# Patient Record
Sex: MALE | Race: WHITE | NOT HISPANIC OR LATINO | Employment: FULL TIME | URBAN - METROPOLITAN AREA
[De-identification: names, ages, dates, MRNs, and addresses within clinical notes are randomized per-mention and may not be internally consistent; named-entity substitution may affect disease eponyms.]

---

## 2017-09-19 ENCOUNTER — APPOINTMENT (OUTPATIENT)
Dept: RADIOLOGY | Facility: MEDICAL CENTER | Age: 27
DRG: 057 | End: 2017-09-19
Attending: EMERGENCY MEDICINE

## 2017-09-19 ENCOUNTER — HOSPITAL ENCOUNTER (INPATIENT)
Facility: MEDICAL CENTER | Age: 27
LOS: 1 days | DRG: 057 | End: 2017-09-20
Attending: EMERGENCY MEDICINE | Admitting: HOSPITALIST

## 2017-09-19 ENCOUNTER — RESOLUTE PROFESSIONAL BILLING HOSPITAL PROF FEE (OUTPATIENT)
Dept: HOSPITALIST | Facility: MEDICAL CENTER | Age: 27
End: 2017-09-19

## 2017-09-19 DIAGNOSIS — R40.2420: ICD-10-CM

## 2017-09-19 DIAGNOSIS — F10.929 ALCOHOL INTOXICATION, WITH UNSPECIFIED COMPLICATION (HCC): ICD-10-CM

## 2017-09-19 DIAGNOSIS — M62.82 NON-TRAUMATIC RHABDOMYOLYSIS: ICD-10-CM

## 2017-09-19 DIAGNOSIS — T68.XXXA HYPOTHERMIA, INITIAL ENCOUNTER: ICD-10-CM

## 2017-09-19 PROBLEM — G93.40 ENCEPHALOPATHY: Status: ACTIVE | Noted: 2017-09-19

## 2017-09-19 LAB
ALBUMIN SERPL BCP-MCNC: 3.5 G/DL (ref 3.2–4.9)
ALBUMIN SERPL BCP-MCNC: 4.5 G/DL (ref 3.2–4.9)
ALBUMIN/GLOB SERPL: 1.3 G/DL
ALBUMIN/GLOB SERPL: 1.4 G/DL
ALP SERPL-CCNC: 36 U/L (ref 30–99)
ALP SERPL-CCNC: 46 U/L (ref 30–99)
ALT SERPL-CCNC: 118 U/L (ref 2–50)
ALT SERPL-CCNC: 90 U/L (ref 2–50)
AMPHETAMINES UR QL: POSITIVE
ANION GAP SERPL CALC-SCNC: 9 MMOL/L (ref 0–11.9)
ANION GAP SERPL CALC-SCNC: 9 MMOL/L (ref 0–11.9)
AST SERPL-CCNC: 129 U/L (ref 12–45)
AST SERPL-CCNC: 93 U/L (ref 12–45)
BARBITURATES UR QL SCN: NEGATIVE
BASOPHILS # BLD AUTO: 0.8 % (ref 0–1.8)
BASOPHILS # BLD AUTO: 0.9 % (ref 0–1.8)
BASOPHILS # BLD: 0.07 K/UL (ref 0–0.12)
BASOPHILS # BLD: 0.07 K/UL (ref 0–0.12)
BENZODIAZ UR QL SCN: POSITIVE
BILIRUB SERPL-MCNC: 0.5 MG/DL (ref 0.1–1.5)
BILIRUB SERPL-MCNC: 0.7 MG/DL (ref 0.1–1.5)
BUN SERPL-MCNC: 11 MG/DL (ref 8–22)
BUN SERPL-MCNC: 12 MG/DL (ref 8–22)
BZE UR QL SCN: POSITIVE
CALCIUM SERPL-MCNC: 7.2 MG/DL (ref 8.4–10.2)
CALCIUM SERPL-MCNC: 8.2 MG/DL (ref 8.4–10.2)
CHLORIDE SERPL-SCNC: 105 MMOL/L (ref 96–112)
CHLORIDE SERPL-SCNC: 109 MMOL/L (ref 96–112)
CK SERPL-CCNC: 2588 U/L (ref 0–154)
CO2 SERPL-SCNC: 23 MMOL/L (ref 20–33)
CO2 SERPL-SCNC: 27 MMOL/L (ref 20–33)
CREAT SERPL-MCNC: 0.9 MG/DL (ref 0.5–1.4)
CREAT SERPL-MCNC: 1.2 MG/DL (ref 0.5–1.4)
EKG IMPRESSION: NORMAL
EOSINOPHIL # BLD AUTO: 0.09 K/UL (ref 0–0.51)
EOSINOPHIL # BLD AUTO: 0.13 K/UL (ref 0–0.51)
EOSINOPHIL NFR BLD: 1 % (ref 0–6.9)
EOSINOPHIL NFR BLD: 1.7 % (ref 0–6.9)
ERYTHROCYTE [DISTWIDTH] IN BLOOD BY AUTOMATED COUNT: 39.9 FL (ref 35.9–50)
ERYTHROCYTE [DISTWIDTH] IN BLOOD BY AUTOMATED COUNT: 40 FL (ref 35.9–50)
ETHANOL BLD-MCNC: 0.37 G/DL
GFR SERPL CREATININE-BSD FRML MDRD: >60 ML/MIN/1.73 M 2
GFR SERPL CREATININE-BSD FRML MDRD: >60 ML/MIN/1.73 M 2
GLOBULIN SER CALC-MCNC: 2.7 G/DL (ref 1.9–3.5)
GLOBULIN SER CALC-MCNC: 3.2 G/DL (ref 1.9–3.5)
GLUCOSE SERPL-MCNC: 104 MG/DL (ref 65–99)
GLUCOSE SERPL-MCNC: 93 MG/DL (ref 65–99)
HCT VFR BLD AUTO: 38.8 % (ref 42–52)
HCT VFR BLD AUTO: 46.9 % (ref 42–52)
HGB BLD-MCNC: 13.2 G/DL (ref 14–18)
HGB BLD-MCNC: 15.9 G/DL (ref 14–18)
IMM GRANULOCYTES # BLD AUTO: 0.02 K/UL (ref 0–0.11)
IMM GRANULOCYTES # BLD AUTO: 0.03 K/UL (ref 0–0.11)
IMM GRANULOCYTES NFR BLD AUTO: 0.3 % (ref 0–0.9)
IMM GRANULOCYTES NFR BLD AUTO: 0.3 % (ref 0–0.9)
LACTATE BLD-SCNC: 2.04 MMOL/L (ref 0.5–2)
LIPASE SERPL-CCNC: 32 U/L (ref 7–58)
LYMPHOCYTES # BLD AUTO: 1.97 K/UL (ref 1–4.8)
LYMPHOCYTES # BLD AUTO: 2.21 K/UL (ref 1–4.8)
LYMPHOCYTES NFR BLD: 24.3 % (ref 22–41)
LYMPHOCYTES NFR BLD: 26.4 % (ref 22–41)
MAGNESIUM SERPL-MCNC: 1.9 MG/DL (ref 1.5–2.5)
MCH RBC QN AUTO: 30 PG (ref 27–33)
MCH RBC QN AUTO: 30 PG (ref 27–33)
MCHC RBC AUTO-ENTMCNC: 33.9 G/DL (ref 33.7–35.3)
MCHC RBC AUTO-ENTMCNC: 34 G/DL (ref 33.7–35.3)
MCV RBC AUTO: 88.2 FL (ref 81.4–97.8)
MCV RBC AUTO: 88.5 FL (ref 81.4–97.8)
MONOCYTES # BLD AUTO: 0.46 K/UL (ref 0–0.85)
MONOCYTES # BLD AUTO: 0.53 K/UL (ref 0–0.85)
MONOCYTES NFR BLD AUTO: 5 % (ref 0–13.4)
MONOCYTES NFR BLD AUTO: 7.1 % (ref 0–13.4)
NEUTROPHILS # BLD AUTO: 4.75 K/UL (ref 1.82–7.42)
NEUTROPHILS # BLD AUTO: 6.25 K/UL (ref 1.82–7.42)
NEUTROPHILS NFR BLD: 63.6 % (ref 44–72)
NEUTROPHILS NFR BLD: 68.6 % (ref 44–72)
NRBC # BLD AUTO: 0 K/UL
NRBC # BLD AUTO: 0 K/UL
NRBC BLD AUTO-RTO: 0 /100 WBC
NRBC BLD AUTO-RTO: 0 /100 WBC
PCP UR QL SCN: NEGATIVE
PHOSPHATE SERPL-MCNC: 4.2 MG/DL (ref 2.5–4.5)
PLATELET # BLD AUTO: 235 K/UL (ref 164–446)
PLATELET # BLD AUTO: 270 K/UL (ref 164–446)
PMV BLD AUTO: 10 FL (ref 9–12.9)
PMV BLD AUTO: 10 FL (ref 9–12.9)
POTASSIUM SERPL-SCNC: 3.6 MMOL/L (ref 3.6–5.5)
POTASSIUM SERPL-SCNC: 3.6 MMOL/L (ref 3.6–5.5)
PROT SERPL-MCNC: 6.2 G/DL (ref 6–8.2)
PROT SERPL-MCNC: 7.7 G/DL (ref 6–8.2)
RBC # BLD AUTO: 4.4 M/UL (ref 4.7–6.1)
RBC # BLD AUTO: 5.3 M/UL (ref 4.7–6.1)
SODIUM SERPL-SCNC: 141 MMOL/L (ref 135–145)
SODIUM SERPL-SCNC: 141 MMOL/L (ref 135–145)
SPECIMEN DRAWN FROM PATIENT: ABNORMAL
UR OPIATES 2659: NEGATIVE
UR THC 2511T: NEGATIVE
UR TRICYCLIC 2660: NEGATIVE
WBC # BLD AUTO: 7.5 K/UL (ref 4.8–10.8)
WBC # BLD AUTO: 9.1 K/UL (ref 4.8–10.8)

## 2017-09-19 PROCEDURE — 94760 N-INVAS EAR/PLS OXIMETRY 1: CPT

## 2017-09-19 PROCEDURE — 96376 TX/PRO/DX INJ SAME DRUG ADON: CPT

## 2017-09-19 PROCEDURE — 84100 ASSAY OF PHOSPHORUS: CPT

## 2017-09-19 PROCEDURE — 93010 ELECTROCARDIOGRAM REPORT: CPT | Performed by: INTERNAL MEDICINE

## 2017-09-19 PROCEDURE — 700101 HCHG RX REV CODE 250: Performed by: HOSPITALIST

## 2017-09-19 PROCEDURE — 71010 DX-CHEST-PORTABLE (1 VIEW): CPT

## 2017-09-19 PROCEDURE — 700111 HCHG RX REV CODE 636 W/ 250 OVERRIDE (IP)

## 2017-09-19 PROCEDURE — 96374 THER/PROPH/DIAG INJ IV PUSH: CPT

## 2017-09-19 PROCEDURE — 303105 HCHG CATHETER EXTRA

## 2017-09-19 PROCEDURE — 99223 1ST HOSP IP/OBS HIGH 75: CPT | Performed by: HOSPITALIST

## 2017-09-19 PROCEDURE — 83690 ASSAY OF LIPASE: CPT

## 2017-09-19 PROCEDURE — 99291 CRITICAL CARE FIRST HOUR: CPT

## 2017-09-19 PROCEDURE — 72125 CT NECK SPINE W/O DYE: CPT

## 2017-09-19 PROCEDURE — 304562 HCHG STAT O2 MASK/CANNULA

## 2017-09-19 PROCEDURE — 700105 HCHG RX REV CODE 258: Performed by: HOSPITALIST

## 2017-09-19 PROCEDURE — 83735 ASSAY OF MAGNESIUM: CPT

## 2017-09-19 PROCEDURE — 83605 ASSAY OF LACTIC ACID: CPT

## 2017-09-19 PROCEDURE — 700111 HCHG RX REV CODE 636 W/ 250 OVERRIDE (IP): Performed by: HOSPITALIST

## 2017-09-19 PROCEDURE — 700105 HCHG RX REV CODE 258: Performed by: EMERGENCY MEDICINE

## 2017-09-19 PROCEDURE — 770022 HCHG ROOM/CARE - ICU (200)

## 2017-09-19 PROCEDURE — 80307 DRUG TEST PRSMV CHEM ANLYZR: CPT

## 2017-09-19 PROCEDURE — 304561 HCHG STAT O2

## 2017-09-19 PROCEDURE — 96361 HYDRATE IV INFUSION ADD-ON: CPT

## 2017-09-19 PROCEDURE — 51702 INSERT TEMP BLADDER CATH: CPT

## 2017-09-19 PROCEDURE — 82550 ASSAY OF CK (CPK): CPT

## 2017-09-19 PROCEDURE — 80053 COMPREHEN METABOLIC PANEL: CPT | Mod: 91

## 2017-09-19 PROCEDURE — 70450 CT HEAD/BRAIN W/O DYE: CPT

## 2017-09-19 PROCEDURE — 93005 ELECTROCARDIOGRAM TRACING: CPT | Performed by: HOSPITALIST

## 2017-09-19 PROCEDURE — 31720 CLEARANCE OF AIRWAYS: CPT

## 2017-09-19 PROCEDURE — 85025 COMPLETE CBC W/AUTO DIFF WBC: CPT | Mod: 91

## 2017-09-19 PROCEDURE — 80305 DRUG TEST PRSMV DIR OPT OBS: CPT

## 2017-09-19 PROCEDURE — 36415 COLL VENOUS BLD VENIPUNCTURE: CPT

## 2017-09-19 RX ORDER — PROMETHAZINE HYDROCHLORIDE 25 MG/1
12.5-25 SUPPOSITORY RECTAL EVERY 4 HOURS PRN
Status: DISCONTINUED | OUTPATIENT
Start: 2017-09-19 | End: 2017-09-20 | Stop reason: HOSPADM

## 2017-09-19 RX ORDER — ONDANSETRON 4 MG/1
4 TABLET, ORALLY DISINTEGRATING ORAL EVERY 4 HOURS PRN
Status: DISCONTINUED | OUTPATIENT
Start: 2017-09-19 | End: 2017-09-20 | Stop reason: HOSPADM

## 2017-09-19 RX ORDER — ALBUTEROL SULFATE 90 UG/1
2 AEROSOL, METERED RESPIRATORY (INHALATION)
Status: DISCONTINUED | OUTPATIENT
Start: 2017-09-19 | End: 2017-09-19

## 2017-09-19 RX ORDER — POLYETHYLENE GLYCOL 3350 17 G/17G
1 POWDER, FOR SOLUTION ORAL
Status: DISCONTINUED | OUTPATIENT
Start: 2017-09-19 | End: 2017-09-20 | Stop reason: HOSPADM

## 2017-09-19 RX ORDER — ACETAMINOPHEN 325 MG/1
650 TABLET ORAL EVERY 6 HOURS PRN
Status: DISCONTINUED | OUTPATIENT
Start: 2017-09-19 | End: 2017-09-20 | Stop reason: HOSPADM

## 2017-09-19 RX ORDER — THIAMINE MONONITRATE (VIT B1) 100 MG
100 TABLET ORAL DAILY
Status: DISCONTINUED | OUTPATIENT
Start: 2017-09-20 | End: 2017-09-20 | Stop reason: HOSPADM

## 2017-09-19 RX ORDER — FOLIC ACID 1 MG/1
1 TABLET ORAL DAILY
Status: DISCONTINUED | OUTPATIENT
Start: 2017-09-20 | End: 2017-09-20 | Stop reason: HOSPADM

## 2017-09-19 RX ORDER — BISACODYL 10 MG
10 SUPPOSITORY, RECTAL RECTAL
Status: DISCONTINUED | OUTPATIENT
Start: 2017-09-19 | End: 2017-09-20 | Stop reason: HOSPADM

## 2017-09-19 RX ORDER — SODIUM CHLORIDE 9 MG/ML
1000 INJECTION, SOLUTION INTRAVENOUS ONCE
Status: COMPLETED | OUTPATIENT
Start: 2017-09-19 | End: 2017-09-19

## 2017-09-19 RX ORDER — ALBUTEROL SULFATE 90 UG/1
2 AEROSOL, METERED RESPIRATORY (INHALATION) EVERY 4 HOURS PRN
Status: DISCONTINUED | OUTPATIENT
Start: 2017-09-19 | End: 2017-09-20 | Stop reason: HOSPADM

## 2017-09-19 RX ORDER — AMOXICILLIN 250 MG
2 CAPSULE ORAL 2 TIMES DAILY
Status: DISCONTINUED | OUTPATIENT
Start: 2017-09-19 | End: 2017-09-20 | Stop reason: HOSPADM

## 2017-09-19 RX ORDER — SODIUM CHLORIDE 9 MG/ML
INJECTION, SOLUTION INTRAVENOUS CONTINUOUS
Status: DISCONTINUED | OUTPATIENT
Start: 2017-09-19 | End: 2017-09-20 | Stop reason: HOSPADM

## 2017-09-19 RX ORDER — PROMETHAZINE HYDROCHLORIDE 25 MG/1
12.5-25 TABLET ORAL EVERY 4 HOURS PRN
Status: DISCONTINUED | OUTPATIENT
Start: 2017-09-19 | End: 2017-09-20 | Stop reason: HOSPADM

## 2017-09-19 RX ORDER — ONDANSETRON 2 MG/ML
4 INJECTION INTRAMUSCULAR; INTRAVENOUS EVERY 4 HOURS PRN
Status: DISCONTINUED | OUTPATIENT
Start: 2017-09-19 | End: 2017-09-20 | Stop reason: HOSPADM

## 2017-09-19 RX ORDER — NALOXONE HYDROCHLORIDE 1 MG/ML
INJECTION INTRAMUSCULAR; INTRAVENOUS; SUBCUTANEOUS
Status: COMPLETED
Start: 2017-09-19 | End: 2017-09-19

## 2017-09-19 RX ORDER — NALOXONE HYDROCHLORIDE 0.4 MG/ML
INJECTION, SOLUTION INTRAMUSCULAR; INTRAVENOUS; SUBCUTANEOUS
Status: COMPLETED
Start: 2017-09-19 | End: 2017-09-19

## 2017-09-19 RX ORDER — NALOXONE HYDROCHLORIDE 0.4 MG/ML
0.4 INJECTION, SOLUTION INTRAMUSCULAR; INTRAVENOUS; SUBCUTANEOUS
Status: DISCONTINUED | OUTPATIENT
Start: 2017-09-19 | End: 2017-09-20 | Stop reason: HOSPADM

## 2017-09-19 RX ADMIN — NALOXONE HYDROCHLORIDE 1 MG: 1 INJECTION PARENTERAL at 07:25

## 2017-09-19 RX ADMIN — ALBUTEROL SULFATE 2 PUFF: 90 AEROSOL, METERED RESPIRATORY (INHALATION) at 23:25

## 2017-09-19 RX ADMIN — ENOXAPARIN SODIUM 40 MG: 100 INJECTION SUBCUTANEOUS at 11:39

## 2017-09-19 RX ADMIN — SODIUM CHLORIDE 2000 ML: 900 INJECTION, SOLUTION INTRAVENOUS at 08:20

## 2017-09-19 RX ADMIN — NALOXONE HYDROCHLORIDE 2 MG: 1 INJECTION PARENTERAL at 08:40

## 2017-09-19 RX ADMIN — FOLIC ACID: 5 INJECTION, SOLUTION INTRAMUSCULAR; INTRAVENOUS; SUBCUTANEOUS at 11:39

## 2017-09-19 RX ADMIN — SODIUM CHLORIDE: 9 INJECTION, SOLUTION INTRAVENOUS at 21:31

## 2017-09-19 ASSESSMENT — LIFESTYLE VARIABLES
HEADACHE, FULLNESS IN HEAD: NOT PRESENT
AGITATION: SOMEWHAT MORE THAN NORMAL ACTIVITY
AUDITORY DISTURBANCES: NOT PRESENT
EVER HAD A DRINK FIRST THING IN THE MORNING TO STEADY YOUR NERVES TO GET RID OF A HANGOVER: YES
TOTAL SCORE: 3
ALCOHOL_USE: YES
VISUAL DISTURBANCES: NOT PRESENT
ON A TYPICAL DAY WHEN YOU DRINK ALCOHOL HOW MANY DRINKS DO YOU HAVE: 6
DOES PATIENT WANT TO STOP DRINKING: NO
ANXIETY: MILDLY ANXIOUS
TOTAL SCORE: 2
HOW MANY TIMES IN THE PAST YEAR HAVE YOU HAD 5 OR MORE DRINKS IN A DAY: 365
TREMOR: NO TREMOR
TOTAL SCORE: 3
CONSUMPTION TOTAL: POSITIVE
NAUSEA AND VOMITING: NO NAUSEA AND NO VOMITING
AVERAGE NUMBER OF DAYS PER WEEK YOU HAVE A DRINK CONTAINING ALCOHOL: 7
EVER_SMOKED: YES
PAROXYSMAL SWEATS: NO SWEAT VISIBLE
HAVE YOU EVER FELT YOU SHOULD CUT DOWN ON YOUR DRINKING: YES
HAVE PEOPLE ANNOYED YOU BY CRITICIZING YOUR DRINKING: NO
ALCOHOL_USE: YES
DO YOU DRINK ALCOHOL: YES
TOTAL SCORE: 3
ORIENTATION AND CLOUDING OF SENSORIUM: ORIENTED AND CAN DO SERIAL ADDITIONS
EVER FELT BAD OR GUILTY ABOUT YOUR DRINKING: YES

## 2017-09-19 ASSESSMENT — PATIENT HEALTH QUESTIONNAIRE - PHQ9
SUM OF ALL RESPONSES TO PHQ9 QUESTIONS 1 AND 2: 0
SUM OF ALL RESPONSES TO PHQ QUESTIONS 1-9: 0
2. FEELING DOWN, DEPRESSED, IRRITABLE, OR HOPELESS: NOT AT ALL
1. LITTLE INTEREST OR PLEASURE IN DOING THINGS: NOT AT ALL

## 2017-09-19 ASSESSMENT — PAIN SCALES - GENERAL
PAINLEVEL_OUTOF10: 0

## 2017-09-19 NOTE — ED PROVIDER NOTES
ED Provider Note    CHIEF COMPLAINT  No chief complaint on file.  Altered mental status    HPI  Artur Renee is a 27 y.o. male who presents to the ER after being found down.  The patient was apparently found by bystanders on the ground around 6 a.m.  EMS was called.  He is to be unresponsive with pinpoint pupils.  He was given some Narcan apparently set up with a became unresponsive.  He has had waxing and waning levels of responsiveness.  He responds to painful stimuli.  Moves all extremities.  Does not answer questions or follow commands.  Prehospital blood sugar was normal, vital signs are normal.  There were not recorded temperature.  No other history is obtainable.  Patient is altered.    REVIEW OF SYSTEMS  See HPI for further details.  Unable to obtain secondary to altered mentation.    PAST MEDICAL HISTORY  Past Medical History:   Diagnosis Date   • Unspecified asthma(493.90)        FAMILY HISTORY  Family History   Problem Relation Age of Onset   • Asthma Mother    • Hypertension Father    • Heart Disease Father        SOCIAL HISTORY  Social History     Social History   • Marital status: Single     Spouse name: N/A   • Number of children: N/A   • Years of education: N/A     Social History Main Topics   • Smoking status: Current Every Day Smoker     Packs/day: 1.00     Types: Cigarettes   • Smokeless tobacco: Never Used   • Alcohol use Yes   • Drug use:      Types: Marijuana   • Sexual activity: Not on file     Other Topics Concern   • Not on file     Social History Narrative   • No narrative on file       SURGICAL HISTORY  Past Surgical History:   Procedure Laterality Date   • ACL REPAIR  10/2009   • DIAPHRAM HERNIA REPAIR CHILD         CURRENT MEDICATIONS  Home Medications    **Home medications have not yet been reviewed for this encounter**         ALLERGIES  No Known Allergies    PHYSICAL EXAM  VITAL SIGNS: There were no vitals taken for this visit.     Constitutional: Supine, somnolent, dry.   Occasionally moving.  No acute cardio point distress  HENT: Normocephalic, Atraumatic, Bilateral external ears normal, Oropharynx dry, No oral exudates, Nose normal.   Eyes: PERRL,  Conjunctiva normal, No discharge.   Cardiovascular: Normal heart rate, Normal rhythm, No murmurs, No rubs, No gallops.   Thorax & Lungs: Normal breath sounds, No respiratory distress, No wheezing,    Abdomen: Bowel sounds normal, Soft, No tenderness  Skin: Warm, Dry, No erythema, No rash.   Back: No tenderness, No CVA tenderness.   Musculoskeletal: Good range of motion in all major joints.  Neurologic: Somnolent, localizes and withdraws from painful stimuli.  Does not answer questions or follow commands.  Psychiatric: Unable to assess    EKG  EKG Interpretation    Interpreted by me    Rhythm: normal sinus   Rate: normal  Axis: normal  Ectopy: none  Conduction: normal  ST Segments: no acute change  T Waves: no acute change  Q Waves: none    Clinical Impression: no acute changes and normal EKG    RADIOLOGY/PROCEDURES  CT-HEAD W/O   Final Result      No evidence of acute intracranial process.      CT-CSPINE WITHOUT PLUS RECONS   Final Result      No evidence of cervical spine fracture.      DX-CHEST-PORTABLE (1 VIEW)   Final Result      Cardiomegaly.        Results for orders placed or performed during the hospital encounter of 09/19/17   CBC WITH DIFFERENTIAL   Result Value Ref Range    WBC 9.1 4.8 - 10.8 K/uL    RBC 5.30 4.70 - 6.10 M/uL    Hemoglobin 15.9 14.0 - 18.0 g/dL    Hematocrit 46.9 42.0 - 52.0 %    MCV 88.5 81.4 - 97.8 fL    MCH 30.0 27.0 - 33.0 pg    MCHC 33.9 33.7 - 35.3 g/dL    RDW 40.0 35.9 - 50.0 fL    Platelet Count 270 164 - 446 K/uL    MPV 10.0 9.0 - 12.9 fL    Neutrophils-Polys 68.60 44.00 - 72.00 %    Lymphocytes 24.30 22.00 - 41.00 %    Monocytes 5.00 0.00 - 13.40 %    Eosinophils 1.00 0.00 - 6.90 %    Basophils 0.80 0.00 - 1.80 %    Immature Granulocytes 0.30 0.00 - 0.90 %    Nucleated RBC 0.00 /100 WBC     Neutrophils (Absolute) 6.25 1.82 - 7.42 K/uL    Lymphs (Absolute) 2.21 1.00 - 4.80 K/uL    Monos (Absolute) 0.46 0.00 - 0.85 K/uL    Eos (Absolute) 0.09 0.00 - 0.51 K/uL    Baso (Absolute) 0.07 0.00 - 0.12 K/uL    Immature Granulocytes (abs) 0.03 0.00 - 0.11 K/uL    NRBC (Absolute) 0.00 K/uL   COMP METABOLIC PANEL   Result Value Ref Range    Sodium 141 135 - 145 mmol/L    Potassium 3.6 3.6 - 5.5 mmol/L    Chloride 105 96 - 112 mmol/L    Co2 27 20 - 33 mmol/L    Anion Gap 9.0 0.0 - 11.9    Glucose 104 (H) 65 - 99 mg/dL    Bun 12 8 - 22 mg/dL    Creatinine 1.20 0.50 - 1.40 mg/dL    Calcium 8.2 (L) 8.4 - 10.2 mg/dL    AST(SGOT) 129 (H) 12 - 45 U/L    ALT(SGPT) 118 (H) 2 - 50 U/L    Alkaline Phosphatase 46 30 - 99 U/L    Total Bilirubin 0.5 0.1 - 1.5 mg/dL    Albumin 4.5 3.2 - 4.9 g/dL    Total Protein 7.7 6.0 - 8.2 g/dL    Globulin 3.2 1.9 - 3.5 g/dL    A-G Ratio 1.4 g/dL   LIPASE   Result Value Ref Range    Lipase 32 7 - 58 U/L   LACTIC ACID   Result Value Ref Range    Lactic Acid 2.04 (H) 0.50 - 2.00 mmol/L    Specimen Venous    DIAGNOSTIC ALCOHOL   Result Value Ref Range    Diagnostic Alcohol 0.37 (H) 0.00 g/dL   CREATINE KINASE   Result Value Ref Range    CPK Total 2588 (HH) 0 - 154 U/L   ESTIMATED GFR   Result Value Ref Range    GFR If African American >60 >60 mL/min/1.73 m 2    GFR If Non African American >60 >60 mL/min/1.73 m 2   UR DRUG SCREEN([facility] ONLY)   Result Value Ref Range    Phencyclidine -Pcp Negative Negative    Benzodiazepines Positive (A) Negative    Cocaine Metabolite Positive (A) Negative    Amphetamines By Triage Positive (A) Negative    Urine THC Negative Negative    Codeine-Morphine Negative Negative    Barbiturates Negative Negative    Tricyclic Antidepressants Negative Negative   EKG: STAT   Result Value Ref Range    Report       [facility]    Test Date:  2017-09-19  Pt Name:    DEIDRE SILVEIRA               Department: [facility]  MRN:        [id number]                      Room:        3324  Gender:     M                            Technician: FA  :        [id number]                   Requested By:CARI MONTERO  Order #:    [id number]                    Reading MD: Luis E Quiles MD    Measurements  Intervals                                Axis  Rate:       85                           P:          37  AL:         184                          QRS:        17  QRSD:       98                           T:          7  QT:         392  QTc:        467    Interpretive Statements  SINUS RHYTHM  No previous ECG available for comparison    Electronically Signed On 2017 13:29:56 PDT by Luis E Quiles MD          COURSE & MEDICAL DECISION MAKING  Pertinent Labs & Imaging studies reviewed. (See chart for details)  Patient presents with altered mental status.  He is brought in by EMS.  History is obtained by EMS.  Prehospital blood sugar was normal.  He had normal vital signs, but they're unable to obtain a temperature.    The patient is assessed at bedside.  He has dry mucous members, but appears to protecting his airway have an intact gag.  He had a positive response to Narcan.  A field per EMS.  He is given some additional [place] with an equivocal response.  It was initially.  A broad differentia diagnosis was considered including but not limited to, such as abuse, head injury.  Environmental injuries and possible electrolyte abnormalities.    Patient is given Narcan when necessary his good results.  His hypothermia is managed with warm IV fluids, temperature Thornotn and a bear hugger.  He said gradual and steady improvement in his temperature.  He has some sonorous respirations and he had a nasopharyngeal airway and initially.  This was removed.  RT came and they suctioned out his nasopharynx and removed a lot of mucus.  He is breathing better.  Chest x-ray is unremarkable.  His buccal level is elevated at 0.37.  He is rhabdomyolysis with a CPK of nearly 3000.  He has multiple positives on  his urine toxicology study.  His head scan is negative.  He is admitted to the hospitalist to the ICU in critical condition.        FINAL IMPRESSION  1. Ellen coma scale total score 9-12, unspecified time (CMS-[facility])    2. Alcohol intoxication, with unspecified complication    3. Non-traumatic rhabdomyolysis    4. Hypothermia, initial encounter    5.  Critical care time 45 minutes not including procedures             Electronically signed by: Adryan Garcia, 9/19/2017 7:29 AM

## 2017-09-19 NOTE — PROGRESS NOTES
Patient transferred to ICU from ER by RN and transport.  Report received from Araceli LI.  Patient is A&O x 3 at this time.  VS stable, safety protocols in place, IV/GTTs verified.

## 2017-09-19 NOTE — ED NOTES
0725-   Chief Complaint   Patient presents with   • Unresponsive     found down in driveway at home, unresponsive, odor of ETOH. Narcan given PTA by EMS with minimal response.   0723- medicated with 1 MG of Narcan with minimal response, pt withdraws and opens eyes to painful stimuli.  0740- pt to CT on monitor and pulse OX with RN and ED tech.  0805-pt back from CT on monitors. Pt snoring, NPA in place, placed on side to assist airway management. Rectal temp 94.8f. Pt placed on bear hugger warming blanket. Warm fluids infusing.  0820-temp yi placed and urine sample to lab.  0840- pt remains minimally responsive, pupils pinpoint, resp at bedside to attempt suctioning. Medicated with additional narcan, pupils now dilated. Pt attempting to sit up in bed, no purposeful responses, MD aware.  0930- Dr Krishna at bedside to evaluate.

## 2017-09-20 VITALS
OXYGEN SATURATION: 95 % | WEIGHT: 270.5 LBS | TEMPERATURE: 97.9 F | HEIGHT: 73 IN | DIASTOLIC BLOOD PRESSURE: 88 MMHG | SYSTOLIC BLOOD PRESSURE: 140 MMHG | RESPIRATION RATE: 14 BRPM | HEART RATE: 76 BPM | BODY MASS INDEX: 35.85 KG/M2

## 2017-09-20 PROBLEM — G93.40 ENCEPHALOPATHY: Status: RESOLVED | Noted: 2017-09-19 | Resolved: 2017-09-20

## 2017-09-20 LAB
ALBUMIN SERPL BCP-MCNC: 3.6 G/DL (ref 3.2–4.9)
ALBUMIN/GLOB SERPL: 1.2 G/DL
ALP SERPL-CCNC: 35 U/L (ref 30–99)
ALT SERPL-CCNC: 76 U/L (ref 2–50)
ANION GAP SERPL CALC-SCNC: 7 MMOL/L (ref 0–11.9)
AST SERPL-CCNC: 70 U/L (ref 12–45)
BASOPHILS # BLD AUTO: 0.7 % (ref 0–1.8)
BASOPHILS # BLD: 0.05 K/UL (ref 0–0.12)
BILIRUB SERPL-MCNC: 1.1 MG/DL (ref 0.1–1.5)
BUN SERPL-MCNC: 11 MG/DL (ref 8–22)
CALCIUM SERPL-MCNC: 7.8 MG/DL (ref 8.4–10.2)
CHLORIDE SERPL-SCNC: 106 MMOL/L (ref 96–112)
CK SERPL-CCNC: 1030 U/L (ref 0–154)
CO2 SERPL-SCNC: 25 MMOL/L (ref 20–33)
CREAT SERPL-MCNC: 0.84 MG/DL (ref 0.5–1.4)
EOSINOPHIL # BLD AUTO: 0.21 K/UL (ref 0–0.51)
EOSINOPHIL NFR BLD: 2.9 % (ref 0–6.9)
ERYTHROCYTE [DISTWIDTH] IN BLOOD BY AUTOMATED COUNT: 38.3 FL (ref 35.9–50)
GFR SERPL CREATININE-BSD FRML MDRD: >60 ML/MIN/1.73 M 2
GLOBULIN SER CALC-MCNC: 2.9 G/DL (ref 1.9–3.5)
GLUCOSE SERPL-MCNC: 81 MG/DL (ref 65–99)
HCT VFR BLD AUTO: 36.6 % (ref 42–52)
HGB BLD-MCNC: 12.6 G/DL (ref 14–18)
IMM GRANULOCYTES # BLD AUTO: 0.02 K/UL (ref 0–0.11)
IMM GRANULOCYTES NFR BLD AUTO: 0.3 % (ref 0–0.9)
LYMPHOCYTES # BLD AUTO: 1.43 K/UL (ref 1–4.8)
LYMPHOCYTES NFR BLD: 20.1 % (ref 22–41)
MAGNESIUM SERPL-MCNC: 1.5 MG/DL (ref 1.5–2.5)
MCH RBC QN AUTO: 29.7 PG (ref 27–33)
MCHC RBC AUTO-ENTMCNC: 34.4 G/DL (ref 33.7–35.3)
MCV RBC AUTO: 86.3 FL (ref 81.4–97.8)
MONOCYTES # BLD AUTO: 0.38 K/UL (ref 0–0.85)
MONOCYTES NFR BLD AUTO: 5.3 % (ref 0–13.4)
NEUTROPHILS # BLD AUTO: 5.03 K/UL (ref 1.82–7.42)
NEUTROPHILS NFR BLD: 70.7 % (ref 44–72)
NRBC # BLD AUTO: 0 K/UL
NRBC BLD AUTO-RTO: 0 /100 WBC
PLATELET # BLD AUTO: 178 K/UL (ref 164–446)
PMV BLD AUTO: 10.1 FL (ref 9–12.9)
POTASSIUM SERPL-SCNC: 3.4 MMOL/L (ref 3.6–5.5)
PROT SERPL-MCNC: 6.5 G/DL (ref 6–8.2)
RBC # BLD AUTO: 4.24 M/UL (ref 4.7–6.1)
SODIUM SERPL-SCNC: 138 MMOL/L (ref 135–145)
WBC # BLD AUTO: 7.1 K/UL (ref 4.8–10.8)

## 2017-09-20 PROCEDURE — 700102 HCHG RX REV CODE 250 W/ 637 OVERRIDE(OP): Performed by: HOSPITALIST

## 2017-09-20 PROCEDURE — 83735 ASSAY OF MAGNESIUM: CPT

## 2017-09-20 PROCEDURE — 80053 COMPREHEN METABOLIC PANEL: CPT

## 2017-09-20 PROCEDURE — A9270 NON-COVERED ITEM OR SERVICE: HCPCS | Performed by: HOSPITALIST

## 2017-09-20 PROCEDURE — 700111 HCHG RX REV CODE 636 W/ 250 OVERRIDE (IP): Performed by: HOSPITALIST

## 2017-09-20 PROCEDURE — 700105 HCHG RX REV CODE 258: Performed by: HOSPITALIST

## 2017-09-20 PROCEDURE — 99238 HOSP IP/OBS DSCHRG MGMT 30/<: CPT | Performed by: HOSPITALIST

## 2017-09-20 PROCEDURE — 82550 ASSAY OF CK (CPK): CPT

## 2017-09-20 PROCEDURE — 85025 COMPLETE CBC W/AUTO DIFF WBC: CPT

## 2017-09-20 RX ORDER — MAGNESIUM SULFATE HEPTAHYDRATE 40 MG/ML
2 INJECTION, SOLUTION INTRAVENOUS ONCE
Status: COMPLETED | OUTPATIENT
Start: 2017-09-20 | End: 2017-09-20

## 2017-09-20 RX ORDER — POTASSIUM CHLORIDE 20 MEQ/1
40 TABLET, EXTENDED RELEASE ORAL ONCE
Status: COMPLETED | OUTPATIENT
Start: 2017-09-20 | End: 2017-09-20

## 2017-09-20 RX ADMIN — MAGNESIUM SULFATE IN WATER 2 G: 40 INJECTION, SOLUTION INTRAVENOUS at 08:53

## 2017-09-20 RX ADMIN — MULTIVITAMIN TABLET 1 TABLET: TABLET at 08:52

## 2017-09-20 RX ADMIN — POTASSIUM CHLORIDE 40 MEQ: 1500 TABLET, EXTENDED RELEASE ORAL at 08:51

## 2017-09-20 RX ADMIN — DOCUSATE SODIUM AND SENNOSIDES 2 TABLET: 8.6; 5 TABLET, FILM COATED ORAL at 08:51

## 2017-09-20 RX ADMIN — SODIUM CHLORIDE: 9 INJECTION, SOLUTION INTRAVENOUS at 04:00

## 2017-09-20 RX ADMIN — FOLIC ACID 1 MG: 1 TABLET ORAL at 08:52

## 2017-09-20 RX ADMIN — Medication 100 MG: at 08:52

## 2017-09-20 ASSESSMENT — LIFESTYLE VARIABLES
ANXIETY: NO ANXIETY (AT EASE)
AVERAGE NUMBER OF DAYS PER WEEK YOU HAVE A DRINK CONTAINING ALCOHOL: 7
TREMOR: *
HEADACHE, FULLNESS IN HEAD: NOT PRESENT
AGITATION: NORMAL ACTIVITY
HOW MANY TIMES IN THE PAST YEAR HAVE YOU HAD 5 OR MORE DRINKS IN A DAY: 365
HAVE PEOPLE ANNOYED YOU BY CRITICIZING YOUR DRINKING: NO
TREMOR: NO TREMOR
HAVE YOU EVER FELT YOU SHOULD CUT DOWN ON YOUR DRINKING: YES
ON A TYPICAL DAY WHEN YOU DRINK ALCOHOL HOW MANY DRINKS DO YOU HAVE: 6
TOTAL SCORE: 2
NAUSEA AND VOMITING: NO NAUSEA AND NO VOMITING
NAUSEA AND VOMITING: NO NAUSEA AND NO VOMITING
VISUAL DISTURBANCES: NOT PRESENT
EVER_SMOKED: YES
TOTAL SCORE: 3
ORIENTATION AND CLOUDING OF SENSORIUM: ORIENTED AND CAN DO SERIAL ADDITIONS
TOTAL SCORE: 3
DOES PATIENT WANT TO STOP DRINKING: NO
AGITATION: SOMEWHAT MORE THAN NORMAL ACTIVITY
VISUAL DISTURBANCES: NOT PRESENT
TOTAL SCORE: 3
AUDITORY DISTURBANCES: NOT PRESENT
PAROXYSMAL SWEATS: NO SWEAT VISIBLE
PAROXYSMAL SWEATS: NO SWEAT VISIBLE
CONSUMPTION TOTAL: POSITIVE
DO YOU DRINK ALCOHOL: YES
AUDITORY DISTURBANCES: NOT PRESENT
HEADACHE, FULLNESS IN HEAD: NOT PRESENT
AUDITORY DISTURBANCES: NOT PRESENT
HEADACHE, FULLNESS IN HEAD: NOT PRESENT
VISUAL DISTURBANCES: NOT PRESENT
PAROXYSMAL SWEATS: NO SWEAT VISIBLE
ORIENTATION AND CLOUDING OF SENSORIUM: ORIENTED AND CAN DO SERIAL ADDITIONS
AGITATION: SOMEWHAT MORE THAN NORMAL ACTIVITY
TOTAL SCORE: 2
ANXIETY: MILDLY ANXIOUS
ANXIETY: MILDLY ANXIOUS
ORIENTATION AND CLOUDING OF SENSORIUM: ORIENTED AND CAN DO SERIAL ADDITIONS
EVER HAD A DRINK FIRST THING IN THE MORNING TO STEADY YOUR NERVES TO GET RID OF A HANGOVER: YES
TREMOR: NO TREMOR
TOTAL SCORE: 3
EVER FELT BAD OR GUILTY ABOUT YOUR DRINKING: YES
NAUSEA AND VOMITING: NO NAUSEA AND NO VOMITING

## 2017-09-20 ASSESSMENT — PAIN SCALES - GENERAL
PAINLEVEL_OUTOF10: 0

## 2017-09-20 ASSESSMENT — COPD QUESTIONNAIRES
DURING THE PAST 4 WEEKS HOW MUCH DID YOU FEEL SHORT OF BREATH: NONE/LITTLE OF THE TIME
HAVE YOU SMOKED AT LEAST 100 CIGARETTES IN YOUR ENTIRE LIFE: YES
DO YOU EVER COUGH UP ANY MUCUS OR PHLEGM?: NO/ONLY WITH OCCASIONAL COLDS OR INFECTIONS

## 2017-09-20 NOTE — H&P
DATE OF ADMISSION:  09/19/2017    PRIMARY CARE PROVIDER:  Unknown.    CHIEF COMPLAINT:  Found unresponsive.    HISTORY OF PRESENT ILLNESS:  A 27-year-old male with past medical history   unknown to us.  I do not have any prior medical records available.  The   patient was found by bystanders approximately 6:00 a.m. this morning in   [place].  There is really no other information about the cause of him   being found down, but his drug toxicology screen is positive for multiple   substances.  The patient did respond to Narcan by EMS and then again in the   emergency room despite having negative opiate screen and serum toxicology   screen.  On examination, the patient will respond to sternal rub by opening   his eyes and moving appropriately.  He is unable to provide any interval   history.  There are no family or friends available either.    REVIEW OF SYSTEMS:  Unobtainable as the patient is unresponsive.    PAST MEDICAL HISTORY:  Unknown.    SOCIAL HISTORY:  Unknown.    FAMILY HISTORY:  Unknown.    ALLERGIES:  No known drug allergies.    MEDICATIONS:  Unknown.    PHYSICAL EXAMINATION:  VITAL SIGNS:  Temperature 35.9; blood pressure 121/89, pulse 78, respirations   15, saturating 98% on 3 liters simple mask.  GENERAL:  The patient is well developed, well nourished.  He is in no apparent   distress.  HEENT:  Pupils are mid size.  They are equal and reactive.  Extraocular   movements are intact.  Anicteric sclerae.  NECK:  Supple, no lymphadenopathy, no thyromegaly.  CARDIOVASCULAR:  Regular rate and rhythm.  No murmurs, rubs or gallops.  PMI   is nondisplaced.  RESPIRATORY:  Clear to auscultation bilaterally, no wheezing, no crackles.  ABDOMEN:  Soft, nontender, nondistended.  No rebound or guarding.  EXTREMITIES:  No clubbing, no cyanosis, no edema.  SKIN:  No rash, no petechiae.    LABORATORY DATA:  White blood cell count 7.5, hemoglobin 13.2, platelets 235.    Sodium 141, potassium 3.6, BUN 11, creatinine  0.90.  , .  Urine   toxicology screen positive for benzodiazepines, positive for cocaine.    ASSESSMENT AND PLAN:  This is a 27-year-old male who presents with encephalopathy likely secondary   to polysubstance use.  1.  Encephalopathy.  The patient is encephalopathic.  He is protecting his   airway.  Urine toxicology screen is grossly positive.  CT scan of the head and   C-spine are unremarkable.  The patient will be admitted to the ICU.  Continue   p.r.n. Narcan if he is overly sedated.  He is a high risk of decompensation   and could require intubation.  2.  Rhabdomyolysis.  This is likely due to being found down, hydrate the   patient and repeat his labs in the morning.  3.  Substance use.  We will discuss when the patient becomes more alert.  4.  Alcohol intoxication.  Diagnostic alcohol is 0.37, we will allow his   alcohol to metabolize.  5.  Transaminitis.  I suspect this is related to substance abuse, alcoholism   and possibly due to his active rhabdomyolysis.  Repeat his labs.  6.  Prophylaxis:  Lovenox and bowel management.  7.  Full code.    Case was discussed with emergency room physician, Dr. Garcia.  I reviewed   the above studies including CT scan of the head that showed no evidence of   intracranial bleeding myself.  I expect the patient to remain in the hospital   for greater than 2 midnights.       ____________________________________     MD SHELBY DAHL / MAURICE    DD:  09/19/2017 17:05:46  DT:  09/19/2017 17:28:42    D#:  [id number]  Job#:  [id number]

## 2017-09-20 NOTE — PROGRESS NOTES
1905- Bedside report received by GUILLERMO Damian. Pt resting comfortably in bed at this time. Pt alert and oriented when awake but is sleeping at this time. Pt has not complaints at this time and denies any pain. All lines and pumps checked and verified. All orders, labs and medications reviewed. POC discussed with pt and questions addressed. Bed lock, in low position with alarm on. Call bell in reach. Will continue to monitor.    2140- Pt requesting water and food. Pt awake, alert , and oriented. Yasemin BUSTAMANTE. Dr. Hicks states it is ok to order a regular diet.    0000- pt requesting for yi to be D/C. Called GREY Rios and he states it is ok to remove yi.

## 2017-09-20 NOTE — PROGRESS NOTES
3 x IVs removed without complication.  Patient is ambulatory and steady on his feat.  Coordinating transportation for the patient before discharge.    1205 - Friend to  patient before 1300

## 2017-09-20 NOTE — DISCHARGE PLANNING
Pt d.c home today.  FLORIN Peterson offered pt ETOH cessation resources and pt accepted.  FLORIN offered to answer any questions if pt had any but he did not.  FLORIN informed bs RN Rickie.    Care Transition Team Assessment    Information Source  Orientation : Oriented x 4  Information Given By: Patient  Informant's Name: Artur Renee    Readmission Evaluation  Is this a readmission?: No    Elopement Risk  Legal Hold: No  Ambulatory or Self Mobile in Wheelchair: No-Not an Elopement Risk  Disoriented: No  Psychiatric Symptoms: None  History of Wandering: No  Elopement this Admit: No  Vocalizing Wanting to Leave: No  Displays Behaviors, Body Language Wanting to Leave: No-Not at Risk for Elopement  Elopement Risk: Not at Risk for Elopement  Wanderguard On: No (See Comments) (pt states he wants to leave per day RN but has no means to)  Picture of Patient on Inside Chart Front Cover: No (See Comments) (pt states he wants to leave per day RN but has no means to)  Purple Armband on Patient: No (See Comments) (pt states he wants to leave per day RN but has no means to)    Interdisciplinary Discharge Planning  Does Admitting Nurse Feel This Could be a Complex Discharge?: No  Primary Care Physician:  (does not have)  Lives with - Patient's Self Care Capacity: Friends  Patient or legal guardian wants to designate a caregiver (see row info): No  Support Systems: Friends / Neighbors  Housing / Facility: 2 Story House  Do You Take your Prescribed Medications Regularly:  (no home meds)  Able to Return to Previous ADL's: Yes  Mobility Issues: Yes  Prior Services: Home-Independent  Patient Expects to be Discharged to:: Home  Assistance Needed: No  Durable Medical Equipment: Not Applicable    Discharge Preparedness  What is your plan after discharge?: Home with help  What are your discharge supports?: Other (comment) (friends)              Vision / Hearing Impairment  Vision Impairment : No  Hearing Impairment : No    Values / Beliefs /  Concerns  Values / Beliefs Concerns : No    Advance Directive  Advance Directive?: None    Domestic Abuse  Have you ever been the victim of abuse or violence?: No  Physical Abuse or Sexual Abuse: No  Verbal Abuse or Emotional Abuse: No    Psychological Assessment  History of Substance Abuse: Alcohol  Date Last Used - Alcohol: 9/19/2017    Discharge Risks or Barriers  Discharge risks or barriers?: Substance abuse  Patient risk factors: Substance abuse    Anticipated Discharge Information  Anticipated discharge disposition: Home

## 2017-09-20 NOTE — DISCHARGE SUMMARY
DATE OF DISCHARGE:  09/19/2017    The patient was seen and examined on 09/19/2017, this note documents the   encounter.    DISCHARGE DIAGNOSES:  1.  Acute encephalopathy secondary to alcohol and substance intoxication.  2.  Rhabdomyolysis secondary to immobility.  3.  Transaminitis, likely secondary to alcohol abuse.  4.  Alcohol intoxication, resolved.  5.  Hypothermia, resolved    DISCHARGE MEDICATIONS:  None.    MAJOR STUDIES AND PROCEDURES PERFORMED INPATIENT:  1.  CT scan of the head without contrast 09/19/2017, no evidence of   intracranial process.  2.  CT scan of C-spine 09/19/2017, no evidence of cervical spine fracture.    HOSPITAL COURSE:  This is a 27-year-old male with a history of alcohol and   substance abuse.  The patient presented after being found unresponsive by   bystanders.  He was brought to the emergency room unresponsive.  He remained quite   encephalopathic.  He was transferred to the ICU.  Eventually, he has   metabolized alcohol and substances that he was using.  He is now awake and   oriented.  He is ambulatory and tolerating regular diet.  Patient's CPK is   down to just above a 1000.  He can be discharged home.  We did discuss alcohol   cessation.  He is not interested in stopping drinking at this time.  Also   recommended outpatient followup for his elevated liver enzymes.    Patient was given alcohol cessation resources by social work.    DISCHARGE DISPOSITION:  To home.    FOLLOWUP PLAN:  The patient will be made an appointment at the post-discharge   clinic.    DIET:  Regular as tolerated.    Forty minutes was spent discharging this patient from the hospital.    The patient was admitted to inpatient status due to his acute severe illness,   he was unresponsive at admission, he has improved more quickly than expected   and he will be discharged home today.       ____________________________________     MD SHELBY DAHL / MAURICE    DD:  09/20/2017 08:11:06  DT:  09/20/2017  08:35:33    D#:  [id number]  Job#:  [id number]

## 2017-09-20 NOTE — CARE PLAN
Problem: Safety  Goal: Will remain free from injury    Intervention: Provide assistance with mobility  Will assist with ambulation as needed.

## 2017-09-20 NOTE — PROGRESS NOTES
Received bedside report from Hiwot LI with preceptor GUILLERMO Alarcon.  Patient sleeping in bed and in no apparent signs of discomfort or distress at this time.  Safety protocols in place, gtts verified, and POC reviewed.  Will continue to monitor and coordinate care.

## 2017-09-20 NOTE — CARE PLAN
Problem: Safety  Goal: Will remain free from injury  Outcome: PROGRESSING AS EXPECTED      Problem: Knowledge Deficit  Goal: Knowledge of disease process/condition, treatment plan, diagnostic tests, and medications will improve  Outcome: PROGRESSING AS EXPECTED      Problem: Discharge Barriers/Planning  Goal: Patient's continuum of care needs will be met  Outcome: PROGRESSING AS EXPECTED

## 2017-09-20 NOTE — DISCHARGE INSTRUCTIONS
Discharge Instructions    Discharged to home by car with friend. Discharged via walking, hospital escort: Refused.  Special equipment needed: Not Applicable    Be sure to schedule a follow-up appointment with your primary care doctor or any specialists as instructed.     Discharge Plan:   Diet Plan: Discussed  Activity Level: Discussed  Smoking Cessation Offered: Patient Refused  Confirmed Follow up Appointment: Patient to Call and Schedule Appointment  Confirmed Symptoms Management: Discussed  Medication Reconciliation Updated: Yes  Influenza Vaccine Indication: Patient Refuses    I understand that a diet low in cholesterol, fat, and sodium is recommended for good health. Unless I have been given specific instructions below for another diet, I accept this instruction as my diet prescription.   Other diet: Regular    Special Instructions: None    · Is patient discharged on Warfarin / Coumadin?   No     · Is patient Post Blood Transfusion?  No    Depression / Suicide Risk    As you are discharged from this [facility] facility, it is important to learn how to keep safe from harming yourself.    Recognize the warning signs:  · Abrupt changes in personality, positive or negative- including increase in energy   · Giving away possessions  · Change in eating patterns- significant weight changes-  positive or negative  · Change in sleeping patterns- unable to sleep or sleeping all the time   · Unwillingness or inability to communicate  · Depression  · Unusual sadness, discouragement and loneliness  · Talk of wanting to die  · Neglect of personal appearance   · Rebelliousness- reckless behavior  · Withdrawal from people/activities they love  · Confusion- inability to concentrate     If you or a loved one observes any of these behaviors or has concerns about self-harm, here's what you can do:  · Talk about it- your feelings and reasons for harming yourself  · Remove any means that you might use to hurt yourself (examples:  pills, rope, extension cords, firearm)  · Get professional help from the community (Mental Health, Substance Abuse, psychological counseling)  · Do not be alone:Call your Safe Contact- someone whom you trust who will be there for you.  · Call your local CRISIS HOTLINE 498-3081 or 817-786-4905  · Call your local Children's Mobile Crisis Response Team [place] (049) 881-5449 or www.Oculeve  · Call the toll free National Suicide Prevention Hotlines   · National Suicide Prevention Lifeline 087-807-AENI (2077)  · National Hope Line Network 800-SUICIDE (967-2352)

## 2017-09-20 NOTE — PROGRESS NOTES
2 RN skin check performed.  Patient has a small abrasion to his right shoulder.  Patient's skin is otherwise WNL / intact.

## 2017-09-20 NOTE — CARE PLAN
Problem: Safety  Goal: Will remain free from injury  Outcome: PROGRESSING AS EXPECTED  Instructed pt to use call bell for assistance as needed. Pt verbalizes understanding. Pt bed locked and in low position. Bed alarm on as a reminder for pt.    Problem: Infection  Goal: Will remain free from infection  Outcome: PROGRESSING AS EXPECTED  Will remove yi per pt request.  Intervention: Assess for removal of potential routes of infection, such as IV, central line, intra-arterial or urinary catheters  Will D/C yi per pt request as this is a possible site for infection

## 2017-09-20 NOTE — CARE PLAN
Problem: Safety  Goal: Will remain free from injury  Outcome: PROGRESSING AS EXPECTED      Problem: Knowledge Deficit  Goal: Knowledge of disease process/condition, treatment plan, diagnostic tests, and medications will improve  Outcome: PROGRESSING SLOWER THAN EXPECTED      Problem: Respiratory:  Goal: Respiratory status will improve  Outcome: PROGRESSING AS EXPECTED

## 2017-09-22 ENCOUNTER — PATIENT OUTREACH (OUTPATIENT)
Dept: HEALTH INFORMATION MANAGEMENT | Facility: OTHER | Age: 27
End: 2017-09-22

## 2017-09-22 NOTE — PROGRESS NOTES
09/22/2017 0927 - Discharge Outreach attempt - No answer, B full  09/22/2017 1042 - Discharge Outreach attempt - No answer, [facility] full  09/22/2017 1617 - Discharge Outreach attempt - No answer, [facility] full